# Patient Record
Sex: FEMALE | Race: BLACK OR AFRICAN AMERICAN | URBAN - METROPOLITAN AREA
[De-identification: names, ages, dates, MRNs, and addresses within clinical notes are randomized per-mention and may not be internally consistent; named-entity substitution may affect disease eponyms.]

---

## 2023-04-26 ENCOUNTER — OFFICE VISIT (OUTPATIENT)
Facility: LOCATION | Age: 38
End: 2023-04-26
Payer: COMMERCIAL

## 2023-04-26 DIAGNOSIS — G43.101 MIGRAINE WITH AURA, NOT INTRACTABLE, WITH STATUS MIGRAINOSUS: Primary | ICD-10-CM

## 2023-04-26 DIAGNOSIS — H04.123 TEAR FILM INSUFFICIENCY OF BILATERAL LACRIMAL GLANDS: ICD-10-CM

## 2023-04-26 PROCEDURE — 92083 EXTENDED VISUAL FIELD XM: CPT | Performed by: OPTOMETRIST

## 2023-04-26 PROCEDURE — 92133 CPTRZD OPH DX IMG PST SGM ON: CPT | Performed by: OPTOMETRIST

## 2023-04-26 PROCEDURE — 99213 OFFICE O/P EST LOW 20 MIN: CPT | Performed by: OPTOMETRIST

## 2023-04-26 ASSESSMENT — VISUAL ACUITY
OD: 20/25
OS: 20/25

## 2023-04-26 ASSESSMENT — INTRAOCULAR PRESSURE
OD: 12
OS: 13

## 2023-04-26 NOTE — IMPRESSION/PLAN
Impression: Tear film insufficiency of bilateral lacrimal glands: I70.085.
- Failed ATs alone - Improved with FML Plan: Condition has resolved, advised patient to d/c FML. Dry eyes account for the patient's complaints. There is no evidence of permanent changes to the cornea. Explained condition does not have a cure and will need artificial tears for maintenance. Patient instructed to use artificial tears QID. Patient to call back if symptoms do not improve in or symptoms worsen to discuss further intervention.

## 2023-04-26 NOTE — IMPRESSION/PLAN
Impression: Migraine with aura, not intractable, with status migrainosus: G43.101. Plan: Patient reports that migraine frequency has increased. Patient reports Hx of prescription migraine medication consumption. VF 30-2 and RNFL performed and evaluated at today's examination. All ocular structures appear healthy. IOP within appropriate range. No ocular cause noted during today's exam. Advised patient to continue care with PCP and neurology. RTC: 3-4 week migraine check with repeat RNFL and VF 30-2. VF 30-2 (04/26/2023): OD: Poor-unreliable, sup/inf arcuate; OS: Poor-unreliable, inf arcuate RNFL (04/26/2023): OD: Good-temp borderline; OS: Good-temp borderline. Tilted optic disc explains borderline results OU.